# Patient Record
Sex: FEMALE
[De-identification: names, ages, dates, MRNs, and addresses within clinical notes are randomized per-mention and may not be internally consistent; named-entity substitution may affect disease eponyms.]

---

## 2020-08-31 ENCOUNTER — HOSPITAL ENCOUNTER (EMERGENCY)
Dept: HOSPITAL 5 - ED | Age: 55
LOS: 1 days | Discharge: HOME | End: 2020-09-01
Payer: SELF-PAY

## 2020-08-31 DIAGNOSIS — S06.0X0A: Primary | ICD-10-CM

## 2020-08-31 DIAGNOSIS — S63.592A: ICD-10-CM
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
## 2020-08-31 DIAGNOSIS — S16.1XXA: ICD-10-CM
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
## 2020-08-31 PROCEDURE — 70450 CT HEAD/BRAIN W/O DYE: CPT

## 2020-08-31 PROCEDURE — 72125 CT NECK SPINE W/O DYE: CPT

## 2020-09-01 VITALS — SYSTOLIC BLOOD PRESSURE: 136 MMHG | DIASTOLIC BLOOD PRESSURE: 78 MMHG

## 2020-09-01 NOTE — XRAY REPORT
LEFT WRIST 3 VIEWS



INDICATION / CLINICAL INFORMATION:

fall



COMPARISON:

None available.

 

FINDINGS:



BONES / JOINT(S): No acute fracture or subluxation. Mild degenerative change along the radial aspect 
of the carpus.

SOFT TISSUES: No significant abnormality.



ADDITIONAL FINDINGS: None.







Signer Name: Brayden Bennett MD 

Signed: 9/1/2020 1:24 AM

Workstation Name: [id number]

## 2020-09-01 NOTE — CAT SCAN REPORT
CT head/brain wo con



INDICATION:  posterior pain after fall with head injury.



TECHNIQUE:

All CT scans at this location are performed using the following dose modulation technique: Automated 
exposure control.



CONTRAST:  None.



COMPARISON: None available.



FINDINGS: The ventricular system is appropriate in size and configuration without midline shift. Nega
tive for mass, stroke or hemorrhage.



Imaged portions the paranasal sinuses are clear.



IMPRESSION: Negative CT brain without contrast.



Signer Name: Brayden Bennett MD 

Signed: 9/1/2020 5:15 AM

Workstation Name: [id number]

## 2020-09-01 NOTE — EMERGENCY DEPARTMENT REPORT
ED General Adult HPI





- General


Chief complaint: Extremity Injury, Upper


Stated complaint: LEFT ARM AND HEAD PAIN


Time Seen by Provider: 09/01/20 04:07


Source: patient


Mode of arrival: Ambulatory


Limitations: No Limitations





- History of Present Illness


Initial comments: 





54-year-old  female patient presents with complaints of left wrist pain

and posterior head and neck pain after a trip and fall x yesterday.  She denies 

any loss of consciousness, however states that she did have an episode of 

vomiting and is currently nauseous.  She rates her headache as a 8/10 in 

severity.  Pain in the neck worsens with rotation.  She denies any 

numbness/tingling/weakness in her limbs, dizziness, vision changes, difficulty 

with speech/ambulation, or use of blood thinners.


-: Sudden





- Related Data


                                  Previous Rx's











 Medication  Instructions  Recorded  Last Taken  Type


 


Diclofenac Sodium 50 mg PO TID PRN #21 tablet. 09/01/20 Unknown Rx


 


methOCARBAMOL [Robaxin TAB] 1,500 mg PO Q8H PRN #22 tablet 09/01/20 Unknown Rx


 


traMADoL [Ultram 50 MG tab] 50 mg PO Q6HR PRN #10 tablet 09/01/20 Unknown Rx











                                    Allergies











Allergy/AdvReac Type Severity Reaction Status Date / Time


 


No Known Allergies Allergy   Unverified 09/01/20 00:42














ED Review of Systems


ROS: 


Stated complaint: LEFT ARM AND HEAD PAIN


Other details as noted in HPI





Constitutional: denies: chills, diaphoresis, fever, malaise


Respiratory: denies: cough, shortness of breath


Cardiovascular: denies: chest pain, syncope


Gastrointestinal: nausea, vomiting.  denies: abdominal pain


Musculoskeletal: joint swelling, arthralgia


Skin: denies: rash, lesions, change in color


Neurological: headache.  denies: weakness, numbness, paresthesias, confusion, 

abnormal gait


Hematological/Lymphatic: denies: easy bleeding





ED Past Medical Hx





- Past Medical History


Previous Medical History?: Yes


Hx COPD: Yes


Additional medical history: Thyroid





- Social History


Smoking Status: Current Every Day Smoker


Substance Use Type: None





- Medications


Home Medications: 


                                Home Medications











 Medication  Instructions  Recorded  Confirmed  Last Taken  Type


 


Diclofenac Sodium 50 mg PO TID PRN #21 tablet. 09/01/20  Unknown Rx


 


methOCARBAMOL [Robaxin TAB] 1,500 mg PO Q8H PRN #22 tablet 09/01/20  Unknown Rx


 


traMADoL [Ultram 50 MG tab] 50 mg PO Q6HR PRN #10 tablet 09/01/20  Unknown Rx














ED Physical Exam





- General


Limitations: No Limitations


General appearance: alert, in no apparent distress, obese





- Head


Head exam: Present: atraumatic, normocephalic





- Eye


Eye exam: Present: normal appearance, PERRL.  Absent: scleral icterus





- Neck


Neck exam: Present: tenderness (Mid/upper vertebral tenderness noted), full ROM





- Respiratory


Respiratory exam: Present: normal lung sounds bilaterally.  Absent: respiratory 

distress





- Cardiovascular


Cardiovascular Exam: Present: regular rate, normal rhythm.  Absent: systolic 

murmur, diastolic murmur, rubs, gallop





- Extremities Exam


Extremities exam: Present: normal inspection, full ROM, other (Left wrist 

tenderness and decreased range of motion noted secondary to pain; left hand and 

fingers are neurovascularly intact; no skin changes noted; full range of motion 

of the fingers noted; no snuffbox tenderness )





- Back Exam


Back exam: Present: full ROM





- Neurological Exam


Neurological exam: Present: alert, oriented X3, normal gait.  Absent: motor 

sensory deficit





- Expanded Neurological Exam


  ** Expanded


Speech: Present: fluid speech


Cerebellar function: Romberg: Normal


Sensory exam: Upper Extremity Light Touch: Normal, Lower Extremity Light Touch: 

Normal


Motor strength exam: RUE: 5, LUE: 5, RLE: 5, LLE: 5





- Psychiatric


Psychiatric exam: Present: normal affect, normal mood





- Skin


Skin exam: Present: warm, dry, intact, normal color.  Absent: rash





ED Course


                                   Vital Signs











  09/01/20





  00:41


 


Temperature 97.9 F


 


Pulse Rate 80


 


Respiratory 16





Rate 


 


Blood Pressure 136/78


 


O2 Sat by Pulse 93





Oximetry 














ED Medical Decision Making





- Radiology Data


Radiology results: report reviewed





LEFT WRIST 3 VIEWS 





INDICATION / CLINICAL INFORMATION: 


fall 





COMPARISON: 


None available. 





FINDINGS: 





BONES / JOINT(S): No acute fracture or subluxation. Mild degenerative change 

along the radial 


 aspect of the carpus. 


SOFT TISSUES: No significant abnormality. 





ADDITIONAL FINDINGS: None. 

















CT cervical spine wo con 





INDICATION: upper pain after fall with head injury. 





TECHNIQUE: 


All CT scans at this location are performed using the following dose modulation 

technique: 


 Automated exposure control. 





CONTRAST: None. 





COMPARISON: None available. 





FINDINGS: Satisfactory alignment without fracture. DDD is more localized at C6-

C7 where changes are


 mild/moderate. Negative for significant bony stenosis. 





No significant soft tissue injury. 





IMPRESSION: DDD more localized C6-C7. 














CT head/brain wo con 





INDICATION: posterior pain after fall with head injury. 





TECHNIQUE: 


All CT scans at this location are performed using the following dose modulation 

technique: 


 Automated exposure control. 





CONTRAST: None. 





COMPARISON: None available. 





FINDINGS: The ventricular system is appropriate in size and configuration 

without midline shift. 


 Negative for mass, stroke or hemorrhage. 





Imaged portions the paranasal sinuses are clear. 





IMPRESSION: Negative CT brain without contrast. 








- Medical Decision Making





Patient here with complaints of left wrist pain and headache/neck pain after a 

trip and fall yesterday.  Neuro exam is normal.  CT head and neck is negative 

for any acute findings.  X-ray of left wrist is negative for acute findings.  

Pain is controlled, patient is well-appearing, and stable for discharge home.  

Given episode of vomiting with head injury, diagnosis of concussion given and 

patient educated on brain rest and signs and symptoms that should prompt 

immediate return to the emergency department.  Patient verbalized understanding.

 Patient to follow-up with primary care in 2 days and orthopedics in 3 to 5 

days.


Critical care attestation.: 


If time is entered above; I have spent that time in minutes in the direct care 

of this critically ill patient, excluding procedure time.








ED Disposition


Clinical Impression: 


 Left wrist sprain





Head injury


Qualifiers:


 Encounter type: initial encounter Qualified Code(s): S09.90XA - Unspecified 

injury of head, initial encounter





Concussion


Qualifiers:


 Encounter type: initial encounter Loss of consciousness presence/duration: 

without LOC Qualified Code(s): S06.0X0A - Concussion without loss of 

consciousness, initial encounter





Neck muscle strain


Qualifiers:


 Encounter type: initial encounter Qualified Code(s): S16.1XXA - Strain of 

muscle, fascia and tendon at neck level, initial encounter





Disposition: DC-01 TO HOME OR SELFCARE


Is pt being admited?: No


Condition: Stable


Instructions:  Cervical Spine Strain (ED), Concussion (ED), Wrist Sprain (ED)


Prescriptions: 


Diclofenac Sodium 50 mg PO TID PRN #21 tablet.


 PRN Reason: pain


methOCARBAMOL [Robaxin TAB] 1,500 mg PO Q8H PRN #22 tablet


 PRN Reason: muscle spasm/tightness


traMADoL [Ultram 50 MG tab] 50 mg PO Q6HR PRN #10 tablet


 PRN Reason: Pain , Severe (7-10)


Referrals: 


[facility] [Provider Group] - 3-5 Days


[facility] MD [Staff Physician] - 3-5 Days

## 2020-09-01 NOTE — CAT SCAN REPORT
CT cervical spine wo con



INDICATION:  upper pain after fall with head injury.



TECHNIQUE:

All CT scans at this location are performed using the following dose modulation technique: Automated 
exposure control.



CONTRAST:  None.



COMPARISON: None available.



FINDINGS: Satisfactory alignment without fracture. DDD is more localized at C6-C7 where changes are m
ild/moderate. Negative for significant bony stenosis.



No significant soft tissue injury.



IMPRESSION: DDD more localized C6-C7.



Signer Name: Brayden Bennett MD 

Signed: 9/1/2020 5:19 AM

Workstation Name: [id number]